# Patient Record
Sex: MALE | URBAN - METROPOLITAN AREA
[De-identification: names, ages, dates, MRNs, and addresses within clinical notes are randomized per-mention and may not be internally consistent; named-entity substitution may affect disease eponyms.]

---

## 2023-09-08 ENCOUNTER — OFFICE VISIT (OUTPATIENT)
Dept: PHYSICAL THERAPY | Facility: CLINIC | Age: 68
End: 2023-09-08
Payer: MEDICARE

## 2023-09-08 DIAGNOSIS — M25.562 ACUTE PAIN OF LEFT KNEE: Primary | ICD-10-CM

## 2023-09-08 DIAGNOSIS — M25.662 DECREASED RANGE OF MOTION (ROM) OF LEFT KNEE: ICD-10-CM

## 2023-09-08 DIAGNOSIS — M25.561 ACUTE PAIN OF RIGHT KNEE: ICD-10-CM

## 2023-09-08 PROCEDURE — 97161 PT EVAL LOW COMPLEX 20 MIN: CPT | Performed by: PHYSICAL THERAPIST

## 2023-09-08 NOTE — PROGRESS NOTES
PT Evaluation     Today's date: 2023  Patient name: Fide Faith  : 1955  MRN: [id number]  Referring provider: No ref. provider found  Dx:   Encounter Diagnosis     ICD-10-CM    1. Acute pain of left knee  M25.562       2. Acute pain of right knee  M25.561       3. Decreased range of motion (ROM) of left knee  M25.662                      Assessment  Assessment details: Fide Faith is a 79 y.o. male  who presents with report of acute onset of bilateral knee pain L>R after getting up from low chair about 3 weeks ago. He does report a history of chronic episodic knee issues going back to his childhood. He states pain and initial swelling that occurred at onset of injury has been slowly improving while he has been completing self instructed exercises. He denies any formal care for this injury and presents to OPPT through direct access. He demonstrated lacking bilateral L>R terminal knee extension consistent with possible osteoarthritic changes to knees. He also demos soreness with end range knee flexion L>R consistent with soft tissue irritability, left lateral joint line TTP indicating possible meniscal involvement. He demos poor SLS balance and proprioceptive responses seen with evidence of medial/lateral instability, decreased hip MMT, and sitting to right side during squats. These impairments are restricting stair negotiation, transfers, squatting, functional mobility, recreational activities, and lifting/carrying. Patient's signs and symptoms are consistent with that of the bilateral knee strain with underlying mild degenerative changes to knees.   Patient would benefit from skilled physical therapy services to address their aforementioned functional limitations and progress towards prior level of function and independence with home exercise program.         Impairments: abnormal gait, abnormal or restricted ROM, abnormal movement, activity intolerance, impaired balance, impaired physical strength, lacks appropriate home exercise program and pain with function  Understanding of Dx/Px/POC: good   Prognosis: good    Goals  STG: to be achieved within 2-4 weeks  1. Pt will be I with HEP  2. Pt will demo 50% inc in knee AROM  3. Pt will improve SLS balance to within 5-10 seconds B.    LTG:  to be achieved within 4-8 weeks  1. Pt will be able to negotiate stairs reciprocally without hand rail assistance. 2. Improve SLS balance with EO to greater than 20 seconds. 3. Pt will be able to ambulate without time restrictions pain free. 4. Restore knee AROM to WNL in all planes. 5. Improve FOTO effectiveness score by >8 points. Plan  Plan details: HEP development, stretching, strengthening, A/AA/PROM, joint mobilizations, posture education, STM/MI as needed to reduce muscle tension, muscle reeducation, PLOC discussed and agreed upon with patient. 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 Deanna Garcia reports about 4-5 months ago he noticed soreness in left knee. He altered his activity level and had some pain decrease but not significantly. 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 He states they became swollen and made it painful to walk.   He continues with gentle ROM, using his rowing machine without resistance and they have been improving but continue to have some tightness. Pain Location: Bilateral knee pain/tightness globally but worst in back of knees  Pain Type: Tightness and discomfort  Worst: 2.5/10  Best: 1/10  Current: 1/10     Aggravating Factors: Negotiation of stairs (he descends backwards), walking (only walking short distances), carrying heavier objects, slight soreness after prolonged inactivity, kneeling. Reducing Factors: IB profen and exercising. As per  FOTO pt reports:  "Extreme Difficulty or unable to perform"    - Walking a mile   "Moderate Difficulty" with:     - Performing heavy activities around your home    - With any of your usual work, housework, or school activities    - Performing light activities around your home   Patient Goals  Patient goals for therapy: increased strength and increased motion    Social Support  Steps to enter house: yes  Stairs in house: yes   Lives in: multiple-level home  Lives with: significant other    Employment status: not working  Life stress: Steel fabrication      Diagnostic Tests  No diagnostic tests performed        Objective     Tenderness   Left Knee   Tenderness in the lateral joint line, medial patella and quadriceps tendon. No tenderness in the lateral patella, medial joint line and patellar tendon. Right Knee   No tenderness in the lateral joint line, lateral patella, medial joint line, medial patella, patellar tendon and quadriceps tendon. Tests     Left Knee   Negative anterior drawer, posterior drawer, valgus stress test at 0 degrees, valgus stress test at 30 degrees, varus stress test at 0 degrees and varus stress test at 30 degrees. Right Knee   Negative anterior drawer, posterior drawer, valgus stress test at 0 degrees, valgus stress test at 30 degrees, varus stress test at 0 degrees and varus stress test at 30 degrees.      Ambulation     Ambulation: Stairs   Ascend stairs: independent  Pattern: reciprocal  Railings: one rail  Descend stairs: independent (Descends stairs backwards)  Railings: one rail    Observational Gait   Walking speed and stride length within functional limits. Additional Observational Gait Details  Lacking left knee extension prior to heel contact. MMT IE IE      Left Right Left Right   Knee Flex 5/5 5/5     Knee Ext 5/5 5/5     Hip ABD 4/5 4/5       ROM IE IE      Left Right Left Right   Knee Flex 126 degrees * 131 degrees      Knee Ext -10 degrees  -4 degrees     SLR 49 degrees 52 degrees      * - denotes pain with testing        SLS Balance EO-  L - 2-3s  R - 1-2s    Squat - Sitting towards right LE, pain in left lateral knee and right posterior knee    SLS Squat  Evidence for medial/lateral instability and reports of discomfort.     Flowsheet Rows    Flowsheet Row Most Recent Value   PT/OT G-Codes    Current Score 47   Projected Score 68            Precautions: Standard    Insurance:  AMA/CMS Auth #/ Referral # Start date  Expiration date Co-Insurance   CMS NONE 9/8/23  No                 IE - 9/8  Manuals   9/8/23      IE                     Neuro Re-Ed                                                Ther Ex      HEP   Quad sets   Education

## 2023-09-14 ENCOUNTER — OFFICE VISIT (OUTPATIENT)
Dept: FAMILY MEDICINE CLINIC | Facility: CLINIC | Age: 68
End: 2023-09-14
Payer: MEDICARE

## 2023-09-14 VITALS
DIASTOLIC BLOOD PRESSURE: 80 MMHG | HEIGHT: 74 IN | SYSTOLIC BLOOD PRESSURE: 118 MMHG | WEIGHT: 207 LBS | HEART RATE: 72 BPM | BODY MASS INDEX: 26.56 KG/M2 | TEMPERATURE: 97.3 F

## 2023-09-14 DIAGNOSIS — M17.12 PRIMARY OSTEOARTHRITIS OF LEFT KNEE: ICD-10-CM

## 2023-09-14 DIAGNOSIS — Z76.89 ESTABLISHING CARE WITH NEW DOCTOR, ENCOUNTER FOR: Primary | ICD-10-CM

## 2023-09-14 DIAGNOSIS — N48.6 PEYRONIE DISEASE: ICD-10-CM

## 2023-09-14 PROCEDURE — 99204 OFFICE O/P NEW MOD 45 MIN: CPT | Performed by: FAMILY MEDICINE

## 2023-09-14 NOTE — PROGRESS NOTES
Name: Gabino Kruse      : 1955      MRN: [id number]  Encounter Provider: Loy Wang MD  Encounter Date: 2023   Encounter department: [facility]     1. Establishing care with new doctor, encounter for    2. Primary osteoarthritis of left knee  -     Ambulatory Referral to Physical Therapy; Future    3. Peyronie disease  -     Ambulatory Referral to Urology; Future      BMI Counseling: Body mass index is 26.58 kg/m². The BMI is above normal. Nutrition recommendations include encouraging healthy choices of fruits and vegetables and moderation in carbohydrate intake. Exercise recommendations include exercising 3-5 times per week. No pharmacotherapy was ordered. Rationale for BMI follow-up plan is due to patient being overweight or obese. Subjective      PATIENT IS A NEW PATIENT TO THE PRACTICE    REVIEWED PAST MEDICAL HISTORY  HAS CONCERNS ABOUT BENDING OF HIS PENIS  ONGOING L KNEE PAIN    REVIEWED HEALTH AND PRACTICE ISSUES    Review of Systems   Constitutional: Negative for chills, fatigue and fever. HENT: Negative for congestion, ear discharge, ear pain, mouth sores, postnasal drip, sore throat and trouble swallowing. Eyes: Negative for pain, discharge and visual disturbance. Respiratory: Negative for cough, shortness of breath and wheezing. Cardiovascular: Negative for chest pain, palpitations and leg swelling. Gastrointestinal: Negative for abdominal distention, abdominal pain, blood in stool, diarrhea and nausea. Endocrine: Negative for polydipsia, polyphagia and polyuria. Genitourinary: Negative for dysuria, frequency, hematuria and urgency. Musculoskeletal: Positive for arthralgias and joint swelling. Negative for gait problem. Skin: Negative for pallor and rash. Neurological: Negative for dizziness, syncope, speech difficulty, weakness, light-headedness, numbness and headaches.    Hematological: Negative for adenopathy. Psychiatric/Behavioral: Negative for behavioral problems, confusion and sleep disturbance. The patient is not nervous/anxious. No current outpatient medications on file prior to visit. Objective     /80 (BP Location: Left arm, Patient Position: Sitting, Cuff Size: Large)   Pulse 72   Temp (!) 97.3 °F (36.3 °C) (Temporal)   Ht 6' 2" (1.88 m)   Wt 93.9 kg (207 lb)   BMI 26.58 kg/m²     Physical Exam  Constitutional:       General: He is not in acute distress. Appearance: Normal appearance. He is well-developed and normal weight. He is not ill-appearing. HENT:      Head: Normocephalic and atraumatic. Eyes:      General:         Right eye: No discharge. Left eye: No discharge. Conjunctiva/sclera: Conjunctivae normal.      Pupils: Pupils are equal, round, and reactive to light. Neck:      Thyroid: No thyromegaly. Vascular: No JVD. Cardiovascular:      Rate and Rhythm: Normal rate and regular rhythm. Heart sounds: Normal heart sounds. No murmur heard. Pulmonary:      Effort: Pulmonary effort is normal.      Breath sounds: Normal breath sounds. No wheezing or rales. Abdominal:      General: Bowel sounds are normal.      Palpations: Abdomen is soft. There is no mass. Tenderness: There is no abdominal tenderness. There is no guarding or rebound. Genitourinary:     Penis: Normal.       Comments: NO FIBROSIS PALPATED  Musculoskeletal:         General: No tenderness or deformity. Cervical back: Neck supple. Comments: MODERATE DJD CHANGES  L KNEE EFFUSION  FROM  NO COLLATERAL LIGAMENT INSTABILITY, NEG DRAW   Lymphadenopathy:      Cervical: No cervical adenopathy. Skin:     General: Skin is warm and dry. Findings: No erythema or rash. Neurological:      General: No focal deficit present. Mental Status: He is alert and oriented to person, place, and time.    Psychiatric:         Mood and Affect: Mood normal.         Behavior: Behavior normal.         Thought Content:  Thought content normal.         Judgment: Judgment normal.       Cris Madrigal MD

## 2023-09-20 ENCOUNTER — OFFICE VISIT (OUTPATIENT)
Dept: PHYSICAL THERAPY | Facility: CLINIC | Age: 68
End: 2023-09-20
Payer: MEDICARE

## 2023-09-20 DIAGNOSIS — M25.662 DECREASED RANGE OF MOTION (ROM) OF LEFT KNEE: ICD-10-CM

## 2023-09-20 DIAGNOSIS — M25.562 ACUTE PAIN OF LEFT KNEE: Primary | ICD-10-CM

## 2023-09-20 DIAGNOSIS — M25.561 ACUTE PAIN OF RIGHT KNEE: ICD-10-CM

## 2023-09-20 DIAGNOSIS — M17.12 PRIMARY OSTEOARTHRITIS OF LEFT KNEE: ICD-10-CM

## 2023-09-20 PROCEDURE — 97112 NEUROMUSCULAR REEDUCATION: CPT | Performed by: PHYSICAL THERAPIST

## 2023-09-20 PROCEDURE — 97110 THERAPEUTIC EXERCISES: CPT | Performed by: PHYSICAL THERAPIST

## 2023-09-20 NOTE — PROGRESS NOTES
Daily Note     Today's date: 2023  Patient name: Dahlia Shoemaker  : 1955  MRN: [id number]  Referring provider: Katherine Fitch MD  Dx:   Encounter Diagnosis     ICD-10-CM    1. Acute pain of left knee  M25.562       2. Acute pain of right knee  M25.561       3. Decreased range of motion (ROM) of left knee  M25.662       4. Primary osteoarthritis of left knee  M17.12                      Subjective: Dahlia Shoemaker reports his knee pain has been improving overall. He does continue with intermittent stiffness and soreness however. Objective: See treatment diary below      Assessment: Tolerated treatment well. Patient continues with lack of terminal knee extension L>R. He required cueing to improve SLS balance and even weight distribution throughout foot and LE. He demod good understanding of updated HEP as below. Plan: Continue per plan of care. Precautions: Standard    Insurance:  AMA/CMS Auth #/ Referral # Start date  Expiration date Co-Insurance   CMS NONE 23  No             Access Code: [email or web address]  URL: [email or web address]  Date: 2023  Prepared by: Star Yves    Exercises  - Long Sitting Quad Set  - 1 x daily - 7 x weekly - 3 sets - 10 reps  - Supine Quadriceps Stretch with Strap on Table  - 2-3 x daily - 7 x weekly - 1 sets - 2-4 reps - 30 hold  - Seated Hamstring Stretch  - 2-3 x daily - 7 x weekly - 1 sets - 2-4 reps - 30 hold  - Gastroc Stretch on Step  - 2-3 x daily - 7 x weekly - 1 sets - 2-4 reps - 30 hold  -   - 1 x daily - 7 x weekly - 2 sets - 10 reps - 5 hold  - Sit to Stand with Hands on Knees  - 1 x daily - 7 x weekly - 2 sets - 10 reps    IE - 9/8  Manuals  23     2 IE                     Neuro Re-Ed      Quad sets  15x5s B    Ice skaters  2x10 5s B on airex    STS  Training + 2x10                            Ther Ex      HEP  updated Quad sets   Education      Modified miriam str w/ SOS  3x30s B    HS str seated  3x30s B gastroc str off   3x30s B

## 2023-09-22 ENCOUNTER — OFFICE VISIT (OUTPATIENT)
Dept: PHYSICAL THERAPY | Facility: CLINIC | Age: 68
End: 2023-09-22
Payer: MEDICARE

## 2023-09-22 DIAGNOSIS — M25.562 ACUTE PAIN OF LEFT KNEE: Primary | ICD-10-CM

## 2023-09-22 DIAGNOSIS — M25.662 DECREASED RANGE OF MOTION (ROM) OF LEFT KNEE: ICD-10-CM

## 2023-09-22 DIAGNOSIS — M25.561 ACUTE PAIN OF RIGHT KNEE: ICD-10-CM

## 2023-09-22 DIAGNOSIS — M17.12 PRIMARY OSTEOARTHRITIS OF LEFT KNEE: ICD-10-CM

## 2023-09-22 PROCEDURE — 97110 THERAPEUTIC EXERCISES: CPT | Performed by: PHYSICAL THERAPIST

## 2023-09-22 PROCEDURE — 97112 NEUROMUSCULAR REEDUCATION: CPT | Performed by: PHYSICAL THERAPIST

## 2023-09-22 NOTE — PROGRESS NOTES
Daily Note     Today's date: 2023  Patient name: Augie Martinez  : 1955  MRN: [id number]  Referring provider: Farheen Sousa MD  Dx:   Encounter Diagnosis     ICD-10-CM    1. Acute pain of left knee  M25.562       2. Acute pain of right knee  M25.561       3. Decreased range of motion (ROM) of left knee  M25.662       4. Primary osteoarthritis of left knee  M17.12                      Subjective: Augie Martinez reports he had some minor soreness in posterior knee after last session. He felt good with completing his HEP. Objective: See treatment diary below      Assessment: Tolerated treatment well. Patient with improving proprioceptive awareness and responses in LEs while maintaining proper PF alignment during biodex training, ice skaters and stair negotiation. He does however continue with lack of terminal knee extension. Plan: Continue per plan of care. Precautions: Standard    Insurance:  AMA/CMS Auth #/ Referral # Start date  Expiration date Co-Insurance   CMS NONE 23  No             Access Code: [email or web address]  URL: [email or web address]  Date: 2023  Prepared by: Ashley Howe    Exercises  - Long Sitting Quad Set  - 1 x daily - 7 x weekly - 3 sets - 10 reps  - Supine Quadriceps Stretch with Strap on Table  - 2-3 x daily - 7 x weekly - 1 sets - 2-4 reps - 30 hold  - Seated Hamstring Stretch  - 2-3 x daily - 7 x weekly - 1 sets - 2-4 reps - 30 hold  - Gastroc Stretch on Step  - 2-3 x daily - 7 x weekly - 1 sets - 2-4 reps - 30 hold  -   - 1 x daily - 7 x weekly - 2 sets - 10 reps - 5 hold  - Sit to Stand with Hands on Knees  - 1 x daily - 7 x weekly - 2 sets - 10 reps    IE - /8  Manuals 23    3 2 IE                     Neuro Re-Ed      Quad sets  15x5s B    Ice skaters 2x10 5s on airex 2x10 5s B on airex    STS  Training + 2x10    Fwd step up 6" 15x B     Fwd Step down 6" 20x B     Biodex Static @L6 2 min +  ML wt shifts @L9 3 min +  LOS @L10 2x           Ther Ex      HEP reviewed updated Quad sets   Education      Modified miriam str w/ SOS  3x30s B    HS str seated  3x30s B    gastroc str off   3x30s B    Prone ext hang 5 min

## 2023-09-25 ENCOUNTER — OFFICE VISIT (OUTPATIENT)
Dept: PHYSICAL THERAPY | Facility: CLINIC | Age: 68
End: 2023-09-25
Payer: MEDICARE

## 2023-09-25 DIAGNOSIS — M25.562 ACUTE PAIN OF LEFT KNEE: Primary | ICD-10-CM

## 2023-09-25 DIAGNOSIS — M25.561 ACUTE PAIN OF RIGHT KNEE: ICD-10-CM

## 2023-09-25 DIAGNOSIS — M25.662 DECREASED RANGE OF MOTION (ROM) OF LEFT KNEE: ICD-10-CM

## 2023-09-25 PROCEDURE — 97110 THERAPEUTIC EXERCISES: CPT | Performed by: PHYSICAL THERAPIST

## 2023-09-25 PROCEDURE — 97112 NEUROMUSCULAR REEDUCATION: CPT | Performed by: PHYSICAL THERAPIST

## 2023-09-25 NOTE — PROGRESS NOTES
Daily Note     Today's date: 2023  Patient name: Dahlia Shoemaker  : 1955  MRN: [id number]  Referring provider: Katherine Fitch MD  Dx:   Encounter Diagnosis     ICD-10-CM    1. Acute pain of left knee  M25.562       2. Acute pain of right knee  M25.561       3. Decreased range of motion (ROM) of left knee  M25.662                      Subjective: Dahlia Shoemaker reports his knees had a little soreness after last session in back and front but nothing significant. While lifting some akward objects over weekend he had some soreness in low back but not in knees. Objective: See treatment diary below      Assessment: Tolerated treatment well. Patient exhibited good technique with therapeutic exercises while requiring verbal cues and visual feedback in mirror to improve body mechanics of LEs. He demod good understanding of progressions of HEP. Plan: Continue per plan of care. Precautions: Standard    Insurance:  A/CMS Auth #/ Referral # Start date  Expiration date Co-Insurance   CMS NONE 23  No             Access Code: [email or web address]  URL: [email or web address]  Date: 2023  Prepared by: Star Yves    Exercises  - Long Sitting Quad Set  - 1 x daily - 7 x weekly - 3 sets - 10 reps  - Supine Quadriceps Stretch with Strap on Table  - 2-3 x daily - 7 x weekly - 1 sets - 2-4 reps - 30 hold  - Seated Hamstring Stretch  - 2-3 x daily - 7 x weekly - 1 sets - 2-4 reps - 30 hold  - Gastroc Stretch on Step  - 2-3 x daily - 7 x weekly - 1 sets - 2-4 reps - 30 hold  -   - 1 x daily - 7 x weekly - 2 sets - 10 reps - 5 hold  - Sit to Stand with Hands on Knees  - 1 x daily - 7 x weekly - 2 sets - 10 reps    IE - 9/8  Manuals 23    4 3 2 IE                        Neuro Re-Ed       Quad sets   15x5s B    Ice skaters  2x10 5s on airex 2x10 5s B on airex    STS   Training + 2x10    Fwd step up  6" 15x B     Fwd Step down  6" 20x B     Biodex  Static @L6 2 min +  ML wt shifts @L9 3 min +  LOS @L10 2x     Wobble Board Taps ML  30x      Fwd Lunges 2x10 B in mirror for feedback      [place] bridge w/ham curl  2x10      SLS  4-way reach  2x5 B             Ther Ex       HEP reviewed reviewed updated Quad sets   Education       Modified miriam str w/ SOS   3x30s B    HS str seated   3x30s B    gastroc str off    3x30s B    Prone ext hang  5 min

## 2023-09-27 ENCOUNTER — OFFICE VISIT (OUTPATIENT)
Dept: PHYSICAL THERAPY | Facility: CLINIC | Age: 68
End: 2023-09-27
Payer: MEDICARE

## 2023-09-27 DIAGNOSIS — M25.662 DECREASED RANGE OF MOTION (ROM) OF LEFT KNEE: ICD-10-CM

## 2023-09-27 DIAGNOSIS — M17.12 PRIMARY OSTEOARTHRITIS OF LEFT KNEE: ICD-10-CM

## 2023-09-27 DIAGNOSIS — M25.562 ACUTE PAIN OF LEFT KNEE: Primary | ICD-10-CM

## 2023-09-27 DIAGNOSIS — M25.561 ACUTE PAIN OF RIGHT KNEE: ICD-10-CM

## 2023-09-27 PROCEDURE — 97110 THERAPEUTIC EXERCISES: CPT | Performed by: PHYSICAL THERAPIST

## 2023-09-27 PROCEDURE — 97112 NEUROMUSCULAR REEDUCATION: CPT | Performed by: PHYSICAL THERAPIST

## 2023-09-27 NOTE — PROGRESS NOTES
Daily Note     Today's date: 2023  Patient name: Ericka Judd  : 1955  MRN: [id number]  Referring provider: Kurt Dahl MD  Dx:   Encounter Diagnosis     ICD-10-CM    1. Acute pain of left knee  M25.562       2. Acute pain of right knee  M25.561       3. Decreased range of motion (ROM) of left knee  M25.662       4. Primary osteoarthritis of left knee  M17.12                      Subjective: Ericka Judd reports he has been experiencing low level soreness in posterior knee but no significant adverse effect from PT. 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 Objective: See treatment diary below      Assessment: Tolerated treatment well. Patient continues with poor SLS balance with evidence of medial/lateral instability from both proximal and distal weakness in LE. He also continues with limited bilateral knee extension ROM. Plan: Continue per plan of care. Precautions: Standard    Insurance:  AMA/CMS Auth #/ Referral # Start date  Expiration date Co-Insurance   CMS NONE 23  No             Access Code: [email or web address]  URL: [email or web address]  Date: 2023  Prepared by: Marella Simmonds    Exercises  - Long Sitting Quad Set  - 1 x daily - 7 x weekly - 3 sets - 10 reps  - Supine Quadriceps Stretch with Strap on Table  - 2-3 x daily - 7 x weekly - 1 sets - 2-4 reps - 30 hold  - Seated Hamstring Stretch  - 2-3 x daily - 7 x weekly - 1 sets - 2-4 reps - 30 hold  - Gastroc Stretch on Step  - 2-3 x daily - 7 x weekly - 1 sets - 2-4 reps - 30 hold  -   - 1 x daily - 7 x weekly - 2 sets - 10 reps - 5 hold  - Sit to Stand with Hands on Knees  - 1 x daily - 7 x weekly - 2 sets - 10 reps    IE -   Manuals 23    5 4 3 2 IE                           Neuro Re-Ed        Quad sets    15x5s B    Ice skaters   2x10 5s on airex 2x10 5s B on airex    STS    Training + 2x10    Fwd step up   6" 15x B Fwd Step down 8" 15x B  6" 20x B     Biodex LOS 2x @L9  Static @L6 2 min +  ML wt shifts @L9 3 min +  LOS @L10 2x     Wobble Board  Taps ML  30x      Fwd Lunges  2x10 B in mirror for feedback      Bridge W/ leg ext  2x10 Ball bridge w/ham curl  2x10      SLS   4-way reach  2x5 B      Tall kneel eccentric quad lean backs 2x10        SLS RDLs 2x5 B       Ther Ex        HEP  reviewed reviewed updated Quad sets   Education        Modified miriam str w/ SOS    3x30s B    HS str seated    3x30s B    gastroc str off     3x30s B    Prone ext hang   5 min      Prone ham curls 2x10 B 5lbs w/ 5 second hold in ext

## 2023-10-03 ENCOUNTER — APPOINTMENT (OUTPATIENT)
Dept: PHYSICAL THERAPY | Facility: CLINIC | Age: 68
End: 2023-10-03
Payer: MEDICARE

## 2023-10-06 ENCOUNTER — APPOINTMENT (OUTPATIENT)
Dept: PHYSICAL THERAPY | Facility: CLINIC | Age: 68
End: 2023-10-06
Payer: MEDICARE

## 2023-10-10 ENCOUNTER — EVALUATION (OUTPATIENT)
Dept: PHYSICAL THERAPY | Facility: CLINIC | Age: 68
End: 2023-10-10
Payer: MEDICARE

## 2023-10-10 DIAGNOSIS — M25.562 ACUTE PAIN OF LEFT KNEE: Primary | ICD-10-CM

## 2023-10-10 DIAGNOSIS — M25.662 DECREASED RANGE OF MOTION (ROM) OF LEFT KNEE: ICD-10-CM

## 2023-10-10 DIAGNOSIS — M25.561 ACUTE PAIN OF RIGHT KNEE: ICD-10-CM

## 2023-10-10 DIAGNOSIS — M17.12 PRIMARY OSTEOARTHRITIS OF LEFT KNEE: ICD-10-CM

## 2023-10-10 PROCEDURE — 97110 THERAPEUTIC EXERCISES: CPT | Performed by: PHYSICAL THERAPIST

## 2023-10-10 PROCEDURE — 97112 NEUROMUSCULAR REEDUCATION: CPT | Performed by: PHYSICAL THERAPIST

## 2023-10-10 NOTE — PROGRESS NOTES
Re-Eval     Today's date: 10/10/2023  Patient name: Earle Ng  : 1955  MRN: [id number]  Referring provider: Kaylan Fitch MD  Dx:   Encounter Diagnosis     ICD-10-CM    1. Acute pain of left knee  M25.562       2. Acute pain of right knee  M25.561       3. Decreased range of motion (ROM) of left knee  M25.662       4. Primary osteoarthritis of left knee  M17.12                      Assessment  Earle Ng has been seen for 6 visits of physical therapy. Earle Ng is demonstrating excellent progress with improving bilateral knee strength, ROM, decreasing pain levels, improving balance and proprioceptive responses. He continued with lack of terminal knee extension bilaterally, impaired proprioceptive responses during SLS activity including squatting, negotiation of stairs and heavy lifting. Patient will continue to benefit from skilled physical therapy to continually address the remaining strength, ROM, proprioceptive deficits to be able to return to i[employer], work, recreational activities at [employer]. Impairments: abnormal gait, abnormal or restricted ROM, abnormal movement, activity intolerance, impaired balance, impaired physical strength, lacks appropriate home exercise program and pain with function  Understanding of Dx/Px/POC: good   Prognosis: good    Goals  STG: to be achieved within 2-4 weeks  1. Pt will be I with HEP - Met  2. Pt will demo 50% inc in knee AROM - Met  3. Pt will improve SLS balance to within 5-10 seconds B. - Met    LTG:  to be achieved within 4-8 weeks  1. Pt will be able to negotiate stairs reciprocally without hand rail assistance. - Partially Met  2. Improve SLS balance with EO to greater than 20 seconds. - Met  3. Pt will be able to ambulate without time restrictions pain free. - Met  4. Restore knee AROM to WNL in all planes. - Met  5. Improve FOTO effectiveness score by >8 points. - Met  6.  Pt will be able to improve SLS balance with EC to >10 seconds - New      Plan  Plan details: HEP development, stretching, strengthening, A/AA/PROM, joint mobilizations, posture education, STM/MI as needed to reduce muscle tension, muscle reeducation, PLOC discussed and agreed upon with patient. Patient would benefit from: PT eval and skilled physical therapy  Planned modality interventions: cryotherapy  Planned therapy interventions: manual therapy, neuromuscular re-education, self care, therapeutic activities, therapeutic exercise, home exercise program, joint mobilization, balance, gait training, flexibility, strengthening, stretching and patient education  Frequency: 2x week  Duration in weeks: 6  Treatment plan discussed with: patient        Subjective Evaluation    History of Present Illness  Dahlia Shoemaker reports his knee feels much improved since starting therapy and a has a lot of moments of no pain. 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 He does continued to intermittently descend stairs backwards especially 1st thing in the mornig or after periods of inactivity. He states overall he feels much more stable and is able to move more dynamically. Pain Location: Bilateral knee pain/tightness globally but worst in back of knees  Pain Type: Tightness and discomfort  Worst: 2/10  Best: 0/10  Current: 0/10     Aggravating Factors: Negotiation of stairs (he descends backwards intermittently), walking only after excessive use day before, carrying heavier objects, slight soreness after prolonged inactivity, kneeling. Reducing Factors: IB profen and exercising.     As per  FOTO pt reports:  "Little bit of difficulty" with:    - Walking a mile    - Performing heavy activities around your home   "Moderate Difficulty" with:     - running on even ground     Patient Goals  Patient goals for therapy: increased strength and increased motion    Social Support  Steps to enter house: yes  Stairs in house: yes   Lives in: multiple-level home  Lives with: significant other    Employment status: not working  Life stress: Steel fabrication      Diagnostic Tests  No diagnostic tests performed        Objective     Tenderness   Left Knee   Tenderness in the lateral joint line, medial patella and quadriceps tendon. No tenderness in the lateral patella, medial joint line and patellar tendon. Right Knee   No tenderness in the lateral joint line, lateral patella, medial joint line, medial patella, patellar tendon and quadriceps tendon. Tests     Left Knee   Negative anterior drawer, posterior drawer, valgus stress test at 0 degrees, valgus stress test at 30 degrees, varus stress test at 0 degrees and varus stress test at 30 degrees. Right Knee   Negative anterior drawer, posterior drawer, valgus stress test at 0 degrees, valgus stress test at 30 degrees, varus stress test at 0 degrees and varus stress test at 30 degrees. Ambulation     Ambulation: Stairs   Ascend stairs: independent  Pattern: reciprocal  Railings: one rail  Descend stairs: independent (Descends stairs backwards)  Railings: one rail    Observational Gait   Walking speed and stride length within functional limits. Additional Observational Gait Details  Lacking left knee extension prior to heel contact. MMT IE IE 10/10 10/10    Left Right Left Right   Knee Flex 5/5 5/5 5/5 5/5   Knee Ext 5/5 5/5 5/5 5/5   Hip ABD 4/5 4/5 4/5 4/8     ROM IE IE      Left Right Left Right   Knee Flex 126 degrees * 131 degrees  134 degrees  134 degrees    Knee Ext -10 degrees  -4 degrees -6 degrees  -4 degrees    SLR 49 degrees 52 degrees 51 degrees  52 degrees     * - denotes pain with testing        SLS Balance EO-  L - 2-3s -> 10/10 >30s  R - 1-2s -> 10/10 20s    SLS Balance EC -  Unable to maintain due to severe bilateral knee instability.     Squat - Sitting towards right LE, no longer provokes pain in left lateral knee and right posterior knee    SLS Squat  Evidence for medial/lateral instability and reports of discomfort. Precautions: Standard    Insurance:  AMA/CMS Auth #/ Referral # Start date  Expiration date Co-Insurance   CMS NONE 9/8/23  No             Access Code: [email or web address]  URL: [email or web address]  Date: 09/20/2023  Prepared by: Conan Mcardle    Exercises  - Long Sitting Quad Set  - 1 x daily - 7 x weekly - 3 sets - 10 reps  - Supine Quadriceps Stretch with Strap on Table  - 2-3 x daily - 7 x weekly - 1 sets - 2-4 reps - 30 hold  - Seated Hamstring Stretch  - 2-3 x daily - 7 x weekly - 1 sets - 2-4 reps - 30 hold  - Gastroc Stretch on Step  - 2-3 x daily - 7 x weekly - 1 sets - 2-4 reps - 30 hold  -   - 1 x daily - 7 x weekly - 2 sets - 10 reps - 5 hold  - Sit to Stand with Hands on Knees  - 1 x daily - 7 x weekly - 2 sets - 10 reps    IE - 9/8  Manuals 10/10 9/27 9/25 9/22 9/20/23    6 5 4 3 2                           Neuro Re-Ed        Quad sets     15x5s B   Ice skaters    2x10 5s on airex 2x10 5s B on airex   STS     Training + 2x10   Fwd step up    6" 15x B    Fwd Step down  8" 15x B  6" 20x B    Biodex  LOS 2x @L9  Static @L6 2 min +  ML wt shifts @L9 3 min +  LOS @L10 2x    Wobble Board   Taps ML  30x     Lat lunge Onto BOSU  10x B in mirror       Fwd Lunges Onto bosu  10x B in mirror  2x10 B in mirror for [employer] bridge w/ ham curl  2x10 W/ leg ext  2x10 Ball bridge w/ham curl  2x10     SLS  W/ rebounder toss 3kg ball  /I\ 10x B ea  4-way reach  2x5 B     Tall kneel eccentric quad lean backs  2x10       SLS RDLs  2x5 B      Ther Ex        HEP   reviewed reviewed updated   Education        Modified miriam str w/ SOS Standing  4x30s B    3x30s B   HS str seated     3x30s B   gastroc str off      3x30s B   Prone ext hang    5 min     Prone ham curls  2x10 B 5lbs w/ 5 second hold in ext

## 2023-10-13 ENCOUNTER — OFFICE VISIT (OUTPATIENT)
Dept: PHYSICAL THERAPY | Facility: CLINIC | Age: 68
End: 2023-10-13
Payer: MEDICARE

## 2023-10-13 DIAGNOSIS — M25.561 ACUTE PAIN OF RIGHT KNEE: ICD-10-CM

## 2023-10-13 DIAGNOSIS — M25.662 DECREASED RANGE OF MOTION (ROM) OF LEFT KNEE: ICD-10-CM

## 2023-10-13 DIAGNOSIS — M25.562 ACUTE PAIN OF LEFT KNEE: Primary | ICD-10-CM

## 2023-10-13 DIAGNOSIS — M17.12 PRIMARY OSTEOARTHRITIS OF LEFT KNEE: ICD-10-CM

## 2023-10-13 PROCEDURE — 97112 NEUROMUSCULAR REEDUCATION: CPT | Performed by: PHYSICAL THERAPIST

## 2023-10-13 PROCEDURE — 97110 THERAPEUTIC EXERCISES: CPT | Performed by: PHYSICAL THERAPIST

## 2023-10-13 NOTE — PROGRESS NOTES
Daily Note     Today's date: 10/13/2023  Patient name: Earle Ng  : 1955  MRN: [id number]  Referring provider: Kaylan Fitch MD  Dx:   Encounter Diagnosis     ICD-10-CM    1. Acute pain of left knee  M25.562       2. Acute pain of right knee  M25.561       3. Decreased range of motion (ROM) of left knee  M25.662       4. Primary osteoarthritis of left knee  M17.12                      Subjective: Earle Ng states he continues with tightness around his knee and also experiencing some low back soreness after last session that lasted a day or so. Objective: See treatment diary below      Assessment: Tolerated treatment well. Patient exhibited good technique with therapeutic exercises and would benefit from continued PT to continually improve balance reactions and proprioceptive responses during SLS activity and with twisting, turning movements. Plan: Continue per plan of care. Precautions: Standard    Insurance:  A/CMS Auth #/ Referral # Start date  Expiration date Co-Insurance   CMS NONE 23  No             Access Code: [email or web address]  URL: [email or web address]  Date: 2023  Prepared by: Conan Mcardle    Exercises  - Long Sitting Quad Set  - 1 x daily - 7 x weekly - 3 sets - 10 reps  - Supine Quadriceps Stretch with Strap on Table  - 2-3 x daily - 7 x weekly - 1 sets - 2-4 reps - 30 hold  - Seated Hamstring Stretch  - 2-3 x daily - 7 x weekly - 1 sets - 2-4 reps - 30 hold  - Gastroc Stretch on Step  - 2-3 x daily - 7 x weekly - 1 sets - 2-4 reps - 30 hold  -   - 1 x daily - 7 x weekly - 2 sets - 10 reps - 5 hold  - Sit to Stand with Hands on Knees  - 1 x daily - 7 x weekly - 2 sets - 10 reps    IE -   Manuals 10/13 10/10 9/27 9/25 9/22    7 6 5 4 3                           Neuro Re-Ed        Sled push/pull 105lbs  6x30ft       Ice skaters W/90 rotation  2x10 5s    2x10 5s on airex   STS        Fwd step up     6" 15x B   Fwd Step down   8" 15x B  6" 20x B   Biodex   LOS 2x @L9  Static @L6 2 min +  ML wt shifts @L9 3 min +  LOS @L10 2x   Wobble Board Static balance  2 min ea AP/ML   Taps ML  30x    Lat lunge  Onto BOSU  10x B in mirror      Fwd Lunges  Onto bosu  10x B in mirror  2x10 B in mirror for feedback    [employer] bridge w/ ham curl  2x10 W/ leg ext  2x10 Ball bridge w/ham curl  2x10    SLS   W/ rebounder toss 3kg ball  /I\ 10x B ea  4-way reach  2x5 B    Tall kneel eccentric quad lean backs 10x then DC  2x10      SLS RDLs 2x10 B  2x5 B     Ther Ex        HEP    reviewed reviewed   Education        Modified miraim str w/ SOS Standing  4x30s B Standing  4x30s B      HS str seated        gastroc str off         Prone ext hang     5 min    Prone ham curls   2x10 B 5lbs w/ 5 second hold in ext      Hook LS rot 20x

## 2023-10-17 ENCOUNTER — APPOINTMENT (OUTPATIENT)
Dept: PHYSICAL THERAPY | Facility: CLINIC | Age: 68
End: 2023-10-17
Payer: MEDICARE

## 2023-10-20 ENCOUNTER — OFFICE VISIT (OUTPATIENT)
Dept: PHYSICAL THERAPY | Facility: CLINIC | Age: 68
End: 2023-10-20
Payer: MEDICARE

## 2023-10-20 DIAGNOSIS — M25.562 ACUTE PAIN OF LEFT KNEE: Primary | ICD-10-CM

## 2023-10-20 DIAGNOSIS — M25.561 ACUTE PAIN OF RIGHT KNEE: ICD-10-CM

## 2023-10-20 DIAGNOSIS — M25.662 DECREASED RANGE OF MOTION (ROM) OF LEFT KNEE: ICD-10-CM

## 2023-10-20 DIAGNOSIS — M17.12 PRIMARY OSTEOARTHRITIS OF LEFT KNEE: ICD-10-CM

## 2023-10-20 PROCEDURE — 97110 THERAPEUTIC EXERCISES: CPT | Performed by: PHYSICAL THERAPIST

## 2023-10-20 NOTE — PROGRESS NOTES
Discharge Note     Today's date: 10/20/2023  Patient name: Ericka Judd  : 1955  MRN: [id number]  Referring provider: Kurt Dahl MD  Dx:   Encounter Diagnosis     ICD-10-CM    1. Acute pain of left knee  M25.562       2. Acute pain of right knee  M25.561       3. Decreased range of motion (ROM) of left knee  M25.662       4. Primary osteoarthritis of left knee  M17.12                      Subjective: Ericka Judd reports he has been able to return to all his normal activities with no pain in knee. He also feels he has good understanding of his HEP to continue to address his remaining deficits. Objective: Session spent reviewing and updating HEP as below to gain full I. Assessment: Pt has made great progress at [facility] with reaching a pain free status and full I with comprehensive HEP. With continued compliance he should continue to see improvements in LE strength/balance and decrease risk of reinjury. Plan: DC to I HEP. Precautions: Standard    Insurance:  AMA/CMS Auth #/ Referral # Start date  Expiration date Co-Insurance   CMS NONE 23  No             Access Code: [email or web address]  URL: [email or web address]  Date: 10/20/2023  Prepared by: Marella Simmonds    Exercises  - Long Sitting Quad Set  - 1 x daily - 7 x weekly - 2 sets - 10 reps - 5 hold  - Standing Hip Flexor Stretch on Chair  - 2-3 x daily - 7 x weekly - 1 sets - 3 reps - 30 hold  - Seated Hamstring Stretch  - 2-3 x daily - 7 x weekly - 1 sets - 2-4 reps - 30 hold  - Gastroc Stretch on Step  - 2-3 x daily - 7 x weekly - 1 sets - 2-4 reps - 30 hold  -   - 3-4 x weekly - 2 sets - 10 reps - 5 hold  - Single Leg Balance with Clock Reach  - 3-4 x weekly - 2-3 sets - 5 reps  - Sit to Stand with Hands on Knees  - 1 x daily - 3-4 x weekly - 2 sets - 10 reps  - Standard Lunge  - 3-4 x weekly - 2 sets - 10 reps  - Forward T  - 3-4 x weekly - 2 sets - 10 reps    IE -   Manuals 10/20 10/13 10/10 9/27 9/25    8 7 6 5 4                           Neuro Re-Ed        Sled push/pull  105lbs  6x30ft      Ice skaters  W/90 rotation  2x10 5s      STS        Fwd step up        Fwd Step down    8" 15x B    Biodex    LOS 2x @L9    Wobble Board  Static balance  2 min ea AP/ML   Taps ML  30x   Lat lunge   Onto BOSU  10x B in mirror     Fwd Lunges   Onto bosu  10x B in mirror  2x10 B in mirror for feedback   [employer] bridge w/ ham curl  2x10 W/ leg ext  2x10 Ball bridge w/ham curl  2x10   SLS    W/ rebounder toss 3kg ball  /I\ 10x B ea  4-way reach  2x5 B   Tall kneel eccentric quad lean backs  10x then DC  2x10     SLS RDLs  2x10 B  2x5 B    Ther Ex        HEP Updated and reviewed    reviewed   Education        Modified miriam str w/ SOS  Standing  4x30s B Standing  4x30s B     HS str seated        gastroc str off         Prone ext hang        Prone ham curls    2x10 B 5lbs w/ 5 second hold in ext     Hook LS rot  20x

## 2023-10-24 ENCOUNTER — APPOINTMENT (OUTPATIENT)
Dept: PHYSICAL THERAPY | Facility: CLINIC | Age: 68
End: 2023-10-24
Payer: MEDICARE

## 2023-10-27 ENCOUNTER — APPOINTMENT (OUTPATIENT)
Dept: PHYSICAL THERAPY | Facility: CLINIC | Age: 68
End: 2023-10-27
Payer: MEDICARE

## 2025-06-16 ENCOUNTER — TELEPHONE (OUTPATIENT)
Age: 70
End: 2025-06-16

## 2025-06-16 NOTE — TELEPHONE ENCOUNTER
Added patient to in office cancellation list.  Will call if we get any opening with Dr Hayes or Dr Almazan. (Patient's last ov was 9/14/2023 to establish with Dr Hayes)

## 2025-06-19 ENCOUNTER — TELEPHONE (OUTPATIENT)
Dept: ADMINISTRATIVE | Facility: OTHER | Age: 70
End: 2025-06-19

## 2025-06-19 ENCOUNTER — OFFICE VISIT (OUTPATIENT)
Dept: FAMILY MEDICINE CLINIC | Facility: CLINIC | Age: 70
End: 2025-06-19
Payer: MEDICARE

## 2025-06-19 VITALS
HEIGHT: 74 IN | HEART RATE: 98 BPM | OXYGEN SATURATION: 100 % | BODY MASS INDEX: 26.69 KG/M2 | TEMPERATURE: 97.9 F | WEIGHT: 208 LBS | RESPIRATION RATE: 16 BRPM | DIASTOLIC BLOOD PRESSURE: 76 MMHG | SYSTOLIC BLOOD PRESSURE: 132 MMHG

## 2025-06-19 DIAGNOSIS — R22.0 LUMP OF SCALP: Primary | ICD-10-CM

## 2025-06-19 DIAGNOSIS — Z11.59 NEED FOR HEPATITIS C SCREENING TEST: ICD-10-CM

## 2025-06-19 DIAGNOSIS — Z12.5 SCREENING FOR PROSTATE CANCER: ICD-10-CM

## 2025-06-19 DIAGNOSIS — Z13.6 SCREENING FOR CARDIOVASCULAR CONDITION: ICD-10-CM

## 2025-06-19 DIAGNOSIS — Z13.0 SCREENING FOR DEFICIENCY ANEMIA: ICD-10-CM

## 2025-06-19 PROCEDURE — 99213 OFFICE O/P EST LOW 20 MIN: CPT

## 2025-06-19 PROCEDURE — G2211 COMPLEX E/M VISIT ADD ON: HCPCS

## 2025-06-19 NOTE — LETTER
2nd Request          Procedure Request Form: Colonoscopy      Date Requested: 25  Patient: Yoel Melchor  Patient : 1955   Referring Provider: Adi Hayes MD        Date of Procedure ______________________________       The above patient has informed us that they have completed their   most recent Colonoscopy at your facility. Please complete   this form and attach all corresponding procedure reports/results.    Comments __________________________________________________________  ____________________________________________________________________  ____________________________________________________________________  ____________________________________________________________________    Facility Completing Procedure _________________________________________    Form Completed By (print name) _______________________________________      Signature __________________________________________________________        These reports are needed for  compliance.    Please fax this completed form and a copy of the Report   to the [facility] Based Department as soon as possible via   Fax [phone] attention Salud: Phone [phone].   Our office located at [address] [place] [address]     We thank you for your assistance in treating our mutual patient.

## 2025-06-19 NOTE — TELEPHONE ENCOUNTER
----- Message from Kandi SHAH sent at 6/19/2025 10:42 AM EDT -----  Regarding: CARE GAP REQUEST - [facility]  06/19/25 10:42 AM    Hello, our patient Yoel Melchor has had CRC: Colonoscopy completed/performed. Please assist in updating the patient chart by making an External outreach to [facility] facility located in [place], [place] ([phone]). The date of service is Fall 2023.    Thank you,  Kandi Aaron MA  [facility]

## 2025-06-19 NOTE — LETTER
Procedure Request Form: Colonoscopy      Date Requested: 25  Patient: Yoel Melchor  Patient : 1955   Referring Provider: Adi Hayes, MD        Date of Procedure ______________________________       The above patient has informed us that they have completed their   most recent Colonoscopy at your facility. Please complete   this form and attach all corresponding procedure reports/results.    Comments __________________________________________________________  ____________________________________________________________________  ____________________________________________________________________  ____________________________________________________________________    Facility Completing Procedure _________________________________________    Form Completed By (print name) _______________________________________      Signature __________________________________________________________        These reports are needed for  compliance.    Please fax this completed form and a copy of the Report   to the [facility] Department as soon as possible via   Fax [phone] attention Salud: Phone [phone].   Our office located at [address] [place] [address]     We thank you for your assistance in treating our mutual patient.

## 2025-06-19 NOTE — PROGRESS NOTES
Name: Yoel Melchor      : 1955      MRN: [id number]  Encounter Provider: TANA Gooden  Encounter Date: 2025   Encounter department: [facility]  :  Assessment & Plan  Lump of scalp  Follow up with General surgery  Continue to monitor for increase of size or other abnormality  Follow up with PCP and get labs and IAMV  Orders:  •  Ambulatory Referral to General Surgery; Future    Screening for deficiency anemia    Orders:  •  CBC; Future    Screening for cardiovascular condition    Orders:  •  Comprehensive metabolic panel; Future  •  Lipid Panel with Direct LDL reflex; Future    Need for hepatitis C screening test    Orders:  •  Hepatitis C Antibody; Future    Screening for prostate cancer    Orders:  •  PSA, Total Screen; Future           History of Present Illness   Has had lump for the past two or three years and recently noted feels larger in size and growing at a more rapid pace. History of insitu melanoma of left chest, was removed and has had no issues since.    Denies trauma  Denies illness  Denies shortness of breath  Denies cp            Review of Systems   Constitutional:  Negative for activity change, chills, fatigue and fever.   HENT:  Negative for congestion, ear pain, postnasal drip, sinus pressure, sinus pain and sore throat.    Eyes:  Negative for pain and visual disturbance.   Respiratory:  Negative for apnea, cough, shortness of breath and wheezing.    Cardiovascular:  Negative for chest pain and palpitations.   Gastrointestinal:  Negative for abdominal pain, constipation, diarrhea, nausea and vomiting.   Genitourinary:  Negative for dysuria, frequency, hematuria and urgency.   Musculoskeletal:  Negative for arthralgias, back pain and myalgias.   Skin:  Negative for color change and rash.   Allergic/Immunologic: Negative.    Neurological:  Negative for dizziness, seizures, syncope, weakness, numbness and headaches.   Hematological: Negative.   "  Psychiatric/Behavioral: Negative.     All other systems reviewed and are negative.      Objective   /76 (BP Location: Left arm, Patient Position: Sitting, Cuff Size: Standard)   Pulse 98   Temp 97.9 °F (36.6 °C) (Temporal)   Resp 16   Ht 6' 1.5\" (1.867 m)   Wt 94.3 kg (208 lb)   SpO2 100%   BMI 27.07 kg/m²      Physical Exam  Vitals reviewed.   Constitutional:       General: He is not in acute distress.     Appearance: Normal appearance. He is well-developed.   HENT:      Head: Normocephalic and atraumatic.        Comments: Lump is on the back of the neck lower skull, seems to have some fluid.   No pain  No discoloration of skin  No other abnormalities     Right Ear: Tympanic membrane, ear canal and external ear normal.      Left Ear: Tympanic membrane, ear canal and external ear normal.      Nose: Nose normal.     Eyes:      Conjunctiva/sclera: Conjunctivae normal.     Neck:      Thyroid: No thyroid mass, thyromegaly or thyroid tenderness.      Vascular: No carotid bruit.      Trachea: Trachea normal.     Cardiovascular:      Rate and Rhythm: Normal rate and regular rhythm.      Pulses: Normal pulses.      Heart sounds: Normal heart sounds. No murmur heard.  Pulmonary:      Effort: Pulmonary effort is normal. No respiratory distress.      Breath sounds: Normal breath sounds.   Abdominal:      General: Abdomen is flat.      Palpations: Abdomen is soft.      Tenderness: There is no abdominal tenderness.     Musculoskeletal:         General: No swelling or tenderness. Normal range of motion.      Cervical back: Normal range of motion and neck supple. No edema, erythema, signs of trauma, rigidity, torticollis, tenderness or crepitus. No pain with movement, spinous process tenderness or muscular tenderness. Normal range of motion.      Right lower leg: No edema.      Left lower leg: No edema.   Lymphadenopathy:      Cervical: No cervical adenopathy.      Right cervical: No superficial, deep or posterior " cervical adenopathy.     Left cervical: No superficial, deep or posterior cervical adenopathy.     Skin:     General: Skin is warm and dry.      Capillary Refill: Capillary refill takes less than 2 seconds.      Coloration: Skin is not pale.      Findings: No bruising, erythema, lesion or rash.     Neurological:      General: No focal deficit present.      Mental Status: He is alert and oriented to person, place, and time. Mental status is at baseline.     Psychiatric:         Mood and Affect: Mood normal.         Behavior: Behavior normal.         Thought Content: Thought content normal.         Judgment: Judgment normal.

## 2025-06-20 NOTE — TELEPHONE ENCOUNTER
Upon review of the In Basket request and the patient's chart, initial outreach has been made via fax to facility. Please see Contacts section for details.     Thank you  Salud Stafford MA

## 2025-06-30 ENCOUNTER — CONSULT (OUTPATIENT)
Age: 70
End: 2025-06-30
Payer: MEDICARE

## 2025-06-30 VITALS
BODY MASS INDEX: 27.08 KG/M2 | SYSTOLIC BLOOD PRESSURE: 159 MMHG | TEMPERATURE: 97.3 F | OXYGEN SATURATION: 98 % | HEART RATE: 96 BPM | DIASTOLIC BLOOD PRESSURE: 91 MMHG | HEIGHT: 74 IN | WEIGHT: 211 LBS

## 2025-06-30 DIAGNOSIS — R22.0 LUMP OF SCALP: ICD-10-CM

## 2025-06-30 DIAGNOSIS — R22.1 SUBCUTANEOUS MASS OF NECK: ICD-10-CM

## 2025-06-30 DIAGNOSIS — D17.0 LIPOMA OF NECK: Primary | ICD-10-CM

## 2025-06-30 PROCEDURE — 99204 OFFICE O/P NEW MOD 45 MIN: CPT | Performed by: SURGERY

## 2025-06-30 NOTE — PROGRESS NOTES
Name: Yole Melchor      : 1955      MRN: [id number]  Encounter Provider: Vaughn Deras MD  Encounter Date: 2025   Encounter department: [facility]  :  Assessment & Plan  Lipoma of neck    Orders:    CBC and Platelet; Future    Comprehensive metabolic panel; Future    ECG 12 lead; Future    Plan excision under sedation with local anesthesia in the main operating room    History of Present Illness   HPI  Yoel Melchor is a 69 y.o. male who presents to referral for scalp mass.  Patient states has been there for quite some time and slowly enlarging.    Patient overall in excellent baseline health with no chronic medical issues.      Review of Systems   Constitutional:  Negative for chills and fever.   Respiratory: Negative.     Cardiovascular: Negative.    Gastrointestinal: Negative.    Genitourinary: Negative.    Neurological: Negative.    All other systems reviewed and are negative.  Medical History Reviewed by provider this encounter:     .  Past Medical History   Past Medical History:  Diagnosis Date    Cancer (HCC)      Past Surgical History:  Procedure Laterality Date    RETINAL DETACHMENT SURGERY       No family history on file.   reports that he has never smoked. He has never been exposed to tobacco smoke. He has never used smokeless tobacco. He reports current alcohol use of about 3.0 standard drinks of alcohol per week. He reports that he does not use drugs.  No current outpatient medicationsAllergies[1]   No current outpatient medications on file prior to visit.     No current facility-administered medications on file prior to visit.      Social History[2]     Objective   There were no vitals taken for this visit.     Physical Exam  Vitals reviewed.   Constitutional:       General: He is not in acute distress.     Appearance: Normal appearance.     Cardiovascular:      Rate and Rhythm: Normal rate.   Pulmonary:      Effort: Pulmonary effort is normal.   Abdominal:       General: There is no distension.     Musculoskeletal:         General: Normal range of motion.     Skin:     General: Skin is warm and dry.      Comments: 4 cm x 3 cm x 2 cm smooth, well-circumscribed, soft mass subcutaneous tissue right upper posterior neck most consistent with lipoma     Neurological:      Mental Status: He is alert.              [1] No Known Allergies  [2]   Social History  Tobacco Use    Smoking status: Never     Passive exposure: Never    Smokeless tobacco: Never   Vaping Use    Vaping status: Never Used   Substance and Sexual Activity    Alcohol use: Yes     Alcohol/week: 3.0 standard drinks of alcohol     Types: 3 Glasses of wine per week     Comment: 2 aday    Drug use: Never

## 2025-06-30 NOTE — H&P
Name: Yoel Melchor      : 1955      MRN: [id number]  Encounter Provider: Vaughn Deras MD  Encounter Date: 2025   Encounter department: [facility]  :  Assessment & Plan  Lipoma of neck    Orders:    CBC and Platelet; Future    Comprehensive metabolic panel; Future    ECG 12 lead; Future    Plan excision under sedation with local anesthesia in the main operating room    History of Present Illness   HPI  Yoel Melchor is a 69 y.o. male who presents to referral for scalp mass.  Patient states has been there for quite some time and slowly enlarging.    Patient overall in excellent baseline health with no chronic medical issues.      Review of Systems   Constitutional:  Negative for chills and fever.   Respiratory: Negative.     Cardiovascular: Negative.    Gastrointestinal: Negative.    Genitourinary: Negative.    Neurological: Negative.    All other systems reviewed and are negative.  Medical History Reviewed by provider this encounter:     .  Past Medical History   Past Medical History:  Diagnosis Date    Cancer (HCC)      Past Surgical History:  Procedure Laterality Date    RETINAL DETACHMENT SURGERY       No family history on file.   reports that he has never smoked. He has never been exposed to tobacco smoke. He has never used smokeless tobacco. He reports current alcohol use of about 3.0 standard drinks of alcohol per week. He reports that he does not use drugs.  No current outpatient medicationsAllergies[1]   No current outpatient medications on file prior to visit.     No current facility-administered medications on file prior to visit.      Social History[2]     Objective   There were no vitals taken for this visit.     Physical Exam  Vitals reviewed.   Constitutional:       General: He is not in acute distress.     Appearance: Normal appearance.     Cardiovascular:      Rate and Rhythm: Normal rate.   Pulmonary:      Effort: Pulmonary effort is normal.   Abdominal:       General: There is no distension.     Musculoskeletal:         General: Normal range of motion.     Skin:     General: Skin is warm and dry.      Comments: 4 cm x 3 cm x 2 cm smooth, well-circumscribed, soft mass subcutaneous tissue right upper posterior neck most consistent with lipoma     Neurological:      Mental Status: He is alert.                  [1] No Known Allergies  [2]   Social History  Tobacco Use    Smoking status: Never     Passive exposure: Never    Smokeless tobacco: Never   Vaping Use    Vaping status: Never Used   Substance and Sexual Activity    Alcohol use: Yes     Alcohol/week: 3.0 standard drinks of alcohol     Types: 3 Glasses of wine per week     Comment: 2 aday    Drug use: Never

## 2025-07-01 NOTE — TELEPHONE ENCOUNTER
Upon review of the In Basket request we have found as a result of outreach that the patient did not have the requested item(s) completed or the patient was not seen by the practice.  Not there patient.    Any additional questions or concerns should be emailed to the Practice Liaisons via the appropriate education email address, please do not reply via In Basket.    Thank you  Salud Stafford MA   [facility]

## 2025-07-01 NOTE — TELEPHONE ENCOUNTER
As a follow-up, a second attempt has been made for outreach via fax to facility. Please see Contacts section for details.    Thank you  Salud Stafford MA